# Patient Record
Sex: MALE | Race: AMERICAN INDIAN OR ALASKA NATIVE
[De-identification: names, ages, dates, MRNs, and addresses within clinical notes are randomized per-mention and may not be internally consistent; named-entity substitution may affect disease eponyms.]

---

## 2018-03-11 ENCOUNTER — HOSPITAL ENCOUNTER (EMERGENCY)
Dept: HOSPITAL 5 - ED | Age: 33
Discharge: HOME | End: 2018-03-11
Payer: COMMERCIAL

## 2018-03-11 VITALS — DIASTOLIC BLOOD PRESSURE: 77 MMHG | SYSTOLIC BLOOD PRESSURE: 120 MMHG

## 2018-03-11 DIAGNOSIS — F17.200: ICD-10-CM
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
## 2018-03-11 DIAGNOSIS — S63.610A: Primary | ICD-10-CM
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
## 2018-03-11 PROCEDURE — 99283 EMERGENCY DEPT VISIT LOW MDM: CPT

## 2018-03-11 NOTE — EMERGENCY DEPARTMENT REPORT
Upper Extremity





- HPI


Chief Complaint: Extremity Injury, Upper


Stated Complaint: RIGHT HAND PAIN


Time Seen by Provider: 03/11/18 15:44


Upper Extremity: Left Hand, Left Index Finger


Occurred When: >5 Days


Severity: moderate


Symptoms: Yes Pain with Movement, Yes Swelling, No Deformity, No Limited Range 

of Movement, No Numbness, No Weakness, No Bruising/Ecchymosis, No Laceration or 

Abrasion


Other History: 32-year-old male past medical history none presents with 

complaint of right index finger pain.  Patient states he sprained his finger 

while playing basketball.  States that the ball hit his finger on his finger 

was extended and jammed it.  This occurred a week ago.  States he has some 

aching when he flexes and extends his finger.  Denies injury to any other body 

part.  Visibly ranging all his fingers.





ED Review of Systems


ROS: 


Stated complaint: RIGHT HAND PAIN


Other details as noted in HPI





Constitutional: denies: chills, fever


Eyes: denies: eye pain, eye discharge, vision change


ENT: denies: ear pain, throat pain


Respiratory: denies: cough, shortness of breath, wheezing


Cardiovascular: denies: chest pain, palpitations


Endocrine: no symptoms reported


Gastrointestinal: denies: abdominal pain, nausea, diarrhea


Genitourinary: denies: urgency, dysuria


Musculoskeletal: as per HPI (right index finger pain at MCP joint).  denies: 

back pain, joint swelling, arthralgia


Skin: denies: rash, lesions


Neurological: denies: headache, weakness, paresthesias


Psychiatric: denies: anxiety, depression


Hematological/Lymphatic: denies: easy bleeding, easy bruising





ED Past Medical Hx





- Past Medical History


Previous Medical History?: No





- Surgical History


Past Surgical History?: No





- Social History


Smoking Status: Current Every Day Smoker


Substance Use Type: Alcohol





- Medications


Home Medications: 


 Home Medications











 Medication  Instructions  Recorded  Confirmed  Last Taken  Type


 


Acetaminophen/Codeine [Tylenol #3] 1 tab PO Q6H PRN #15 tab 12/30/13  Unknown Rx


 


Methocarbamol [Robaxin] 750 mg PO Q8H PRN #15 tablet 12/30/13  Unknown Rx


 


Naproxen Sodium (Nf) [Anaprox DS] 550 mg PO BID PRN #10 tablet 12/30/13  

Unknown Rx


 


Ibuprofen [Motrin] 800 mg PO Q8HR PRN #20 tablet 03/11/18  Unknown Rx














Upper Extremity Exam





- Exam


General: 


Vital signs noted. No distress. Alert and acting appropriately.





Head and Torso: No HEENT Abnormality, No Neck Tenderness, No Chest/Lungs 

Abnormality, No Abdominal Tenderness, No Back Tenderness


Shoulder Exam: Yes Normal Range of Motion in Shoulder, No Shoulder Tenderness, 

No Clavicle Tenderness, No Shoulder Deformity, No AC Joint Tenderness


Arm Exam: No Arm/Humerus Tenderness, No Arm Deformity


Elbow: No Elbow Tenderness, No Normal Range of Motion in Elbow, No Elbow 

Deformity


Forearm: No Forearm Tenderness, No Forearm Deformity, No Pain with Pronation, 

No Pain with Supination


Wrist: Yes Normal ROM in Wrist (right wrist flexion and extension intact), No 

Wrist Tenderness, No Wrist Deformity, No Snuffbox Tenderness (is no snuffbox 

tenderness), No Pain with Axial Thumb Compression


Hand: Yes Normal ROM in Digit(s) (range of motion right hand and fingers fully 

intact), No Hand Tenderness, No Hand Deformity, No Digit Tenderness, No Digit(s

) Deformity, No Tendon Dysfunction


CMS Exam: Yes Normal Distal Pulses (distal radial brachial pulses strong to 

palpation), Yes Normal Capillary Refill (distal capillary refill less than one 

second all fingers), Yes Normal Distal Sensation, No Broken Skin


Hand L/R Back: 


  __________________________














  __________________________





 1 - Pain and discomfort on deep palpation








ED Course


 Vital Signs











  03/11/18





  15:26


 


Temperature 98.8 F


 


Pulse Rate 74


 


Respiratory 16





Rate 


 


Blood Pressure 120/77





[Left] 


 


O2 Sat by Pulse 99





Oximetry 














ED Medical Decision Making





- Medical Decision Making


A/P: Right index finger sprain


1-RICE therapy, Motrin when necessary


2-finger splints


3-x-ray shows no fracture





Critical care attestation.: 


If time is entered above; I have spent that time in minutes in the direct care 

of this critically ill patient, excluding procedure time.








ED Disposition


Clinical Impression: 


Sprain of right index finger


Qualifiers:


 Encounter type: initial encounter Sprain of finger site: metacarpophalangeal 

joint Qualified Code(s): S63.650A - Sprain of metacarpophalangeal joint of 

right index finger, initial encounter





Disposition: DC-01 TO HOME OR SELFCARE


Is pt being admited?: No


Does the pt Need Aspirin: No


Condition: Stable


Instructions:  Finger Sprain (ED)


Prescriptions: 


Ibuprofen [Motrin] 800 mg PO Q8HR PRN #20 tablet


 PRN Reason: Pain


Referrals: 


[facility] [Primary Care Provider] - 3-5 Days


[facility] [Provider Group] - 3-5 Days


Forms:  Work/School Release Form(ED)


Time of Disposition: 16:44

## 2018-03-11 NOTE — XRAY REPORT
FINAL REPORT



PROCEDURE:  XR HAND 3+V RT



TECHNIQUE:  Right hand radiographs, AP, lateral, and oblique

views. CPT 57375







HISTORY:  right index finger and hand pain 



COMPARISON:  No prior studies are available for comparison.



FINDINGS:  

Fracture (s) and/or Dislocation(s): None .



Alignment: Normal .



Joint space(s): Normal .



Soft tissues: Normal .



Bone mineralization: Normal .



Foreign bodies: None .







IMPRESSION:  

Negative examination.